# Patient Record
Sex: MALE | Race: WHITE | URBAN - METROPOLITAN AREA
[De-identification: names, ages, dates, MRNs, and addresses within clinical notes are randomized per-mention and may not be internally consistent; named-entity substitution may affect disease eponyms.]

---

## 2018-08-19 ENCOUNTER — EMERGENCY (EMERGENCY)
Facility: HOSPITAL | Age: 58
LOS: 0 days | Discharge: HOME | End: 2018-08-19
Attending: EMERGENCY MEDICINE | Admitting: EMERGENCY MEDICINE

## 2018-08-19 VITALS
TEMPERATURE: 98 F | WEIGHT: 199.96 LBS | HEIGHT: 71 IN | HEART RATE: 69 BPM | SYSTOLIC BLOOD PRESSURE: 138 MMHG | RESPIRATION RATE: 19 BRPM | DIASTOLIC BLOOD PRESSURE: 87 MMHG | OXYGEN SATURATION: 99 %

## 2018-08-19 VITALS — RESPIRATION RATE: 18 BRPM | TEMPERATURE: 97 F | OXYGEN SATURATION: 99 % | HEART RATE: 57 BPM
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
## 2018-08-19 DIAGNOSIS — S61.210A LACERATION WITHOUT FOREIGN BODY OF RIGHT INDEX FINGER WITHOUT DAMAGE TO NAIL, INITIAL ENCOUNTER: ICD-10-CM

## 2018-08-19 DIAGNOSIS — S61.213A LACERATION WITHOUT FOREIGN BODY OF LEFT MIDDLE FINGER WITHOUT DAMAGE TO NAIL, INITIAL ENCOUNTER: ICD-10-CM

## 2018-08-19 DIAGNOSIS — Z23 ENCOUNTER FOR IMMUNIZATION: ICD-10-CM
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
## 2018-08-19 DIAGNOSIS — S61.211A LACERATION WITHOUT FOREIGN BODY OF LEFT INDEX FINGER WITHOUT DAMAGE TO NAIL, INITIAL ENCOUNTER: ICD-10-CM
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
## 2018-08-19 RX ORDER — TETANUS TOXOID, REDUCED DIPHTHERIA TOXOID AND ACELLULAR PERTUSSIS VACCINE, ADSORBED 5; 2.5; 8; 8; 2.5 [IU]/.5ML; [IU]/.5ML; UG/.5ML; UG/.5ML; UG/.5ML
0.5 SUSPENSION INTRAMUSCULAR ONCE
Qty: 0 | Refills: 0 | Status: COMPLETED | OUTPATIENT
Start: 2018-08-19 | End: 2018-08-19

## 2018-08-19 RX ADMIN — TETANUS TOXOID, REDUCED DIPHTHERIA TOXOID AND ACELLULAR PERTUSSIS VACCINE, ADSORBED 0.5 MILLILITER(S): 5; 2.5; 8; 8; 2.5 SUSPENSION INTRAMUSCULAR at 14:46

## 2018-08-19 NOTE — ED PROCEDURE NOTE - ATTENDING CONTRIBUTION TO CARE
. I was present for and supervised the key critical aspects of the procedures performed during the care of the patient.

## 2018-08-19 NOTE — ED PROVIDER NOTE - PROGRESS NOTE DETAILS
57 y/o M on baby aspirin, no diabetes presents to ED after mistakenly cutting himself using a clean knife causing multiple lacerations to fingers. On exam: superficial lacerations to R 2nd MCP and L 2nd and 3rd MCP, 2nd finger being slightly deeper than the rest of the cuts but without evidence of tendon exposure from all fingers. Plan: lac repair and Tetanus. 57 y/o M on baby aspirin, no diabetes presents to ED after mistakenly cutting himself using a clean knife causing multiple lacerations to fingers. On exam: superficial lacerations to R 2nd MCP and L 2nd and 3rd finger, 2nd finger being slightly deeper than the rest of the laceration but without evidence of tendon exposure from all fingers. Plan: lac repair and Tetanus.

## 2018-08-19 NOTE — ED PROCEDURE NOTE - CPROC ED LACER REPAIR DETAIL1
The wound was explored to base in bloodless field.

## 2018-08-19 NOTE — ED PROVIDER NOTE - PHYSICAL EXAMINATION
skin:   left hand 2nd digit volar aspect proximal digit- +linear laceration with subcutaneous fat exposure, no tendon inujury appreciated under bloodless field. good rom of digit . motor and sensation in tact/ good cap refill    left hand 3rd digit small linear laceration superficial / no foreign body present    right hand skin:   left hand 2nd digit volar aspect proximal digit- +linear laceration with subcutaneous fat exposure, no tendon inujury appreciated under bloodless field. good rom of digit . motor and sensation in tact/ good cap refill    left hand 3rd digit small linear laceration superficial / no foreign body present    right hand 2nd digit linear laceration 0.5 cm / no foreign bodies / good rom of digit

## 2018-08-19 NOTE — ED PROCEDURE NOTE - NS ED ATTENDING STATEMENT MOD
I have personally performed a face to face diagnostic evaluation on this patient. I have reviewed the ACP note and agree with the history, exam and plan of care, except as noted.

## 2018-08-19 NOTE — ED PROVIDER NOTE - OBJECTIVE STATEMENT
59 y/o male s/p working with knife c/o laceration to left 2nd, 3rd digit and right hand  PTA. patient denies any tingling to digits. +active bleeding. no other injuries

## 2018-08-19 NOTE — ED ADULT NURSE NOTE - NSIMPLEMENTINTERV_GEN_ALL_ED
Implemented All Universal Safety Interventions:  [place] to call system. Call bell, personal items and telephone within reach. Instruct patient to call for assistance. Room bathroom lighting operational. Non-slip footwear when patient is off stretcher. Physically safe environment: no spills, clutter or unnecessary equipment. Stretcher in lowest position, wheels locked, appropriate side rails in place.

## 2018-08-19 NOTE — ED PROVIDER NOTE - CARE PLAN
Principal Discharge DX:	Laceration of left index finger without foreign body without damage to nail, initial encounter  Secondary Diagnosis:	Laceration of middle finger without foreign body without damage to nail, unspecified laterality, initial encounter  Secondary Diagnosis:	Laceration of index finger without foreign body without damage to nail, unspecified laterality, initial encounter

## 2018-08-19 NOTE — ED PROVIDER NOTE - SECONDARY DIAGNOSIS.
Laceration of middle finger without foreign body without damage to nail, unspecified laterality, initial encounter Laceration of index finger without foreign body without damage to nail, unspecified laterality, initial encounter

## 2018-08-22 PROBLEM — Z00.00 ENCOUNTER FOR PREVENTIVE HEALTH EXAMINATION: Status: ACTIVE | Noted: 2018-08-22

## 2018-08-29 ENCOUNTER — APPOINTMENT (OUTPATIENT)
Dept: PLASTIC SURGERY | Facility: CLINIC | Age: 58
End: 2018-08-29
Payer: COMMERCIAL

## 2018-08-29 DIAGNOSIS — S61.219S LACERATION W/OUT FOREIGN BODY OF LEFT HAND, SEQUELA: ICD-10-CM

## 2018-08-29 DIAGNOSIS — I82.509 CHRONIC EMBOLISM AND THROMBOSIS OF UNSPECIFIED DEEP VEINS OF UNSPECIFIED LOWER EXTREMITY: ICD-10-CM

## 2018-08-29 DIAGNOSIS — S61.412S LACERATION W/OUT FOREIGN BODY OF LEFT HAND, SEQUELA: ICD-10-CM

## 2018-08-29 PROCEDURE — 99202 OFFICE O/P NEW SF 15 MIN: CPT

## 2018-08-29 RX ORDER — ASPIRIN 81 MG
81 TABLET, DELAYED RELEASE (ENTERIC COATED) ORAL
Refills: 0 | Status: ACTIVE | COMMUNITY

## 2024-06-19 ENCOUNTER — OUTPATIENT (OUTPATIENT)
Dept: OUTPATIENT SERVICES | Facility: HOSPITAL | Age: 64
LOS: 1 days | End: 2024-06-19
Payer: COMMERCIAL

## 2024-06-19 VITALS
DIASTOLIC BLOOD PRESSURE: 78 MMHG | TEMPERATURE: 98 F | SYSTOLIC BLOOD PRESSURE: 121 MMHG | HEIGHT: 70 IN | WEIGHT: 194.01 LBS | RESPIRATION RATE: 16 BRPM | HEART RATE: 69 BPM | OXYGEN SATURATION: 99 %

## 2024-06-19 DIAGNOSIS — Z01.818 ENCOUNTER FOR OTHER PREPROCEDURAL EXAMINATION: ICD-10-CM

## 2024-06-19 DIAGNOSIS — Z87.828 PERSONAL HISTORY OF OTHER (HEALED) PHYSICAL INJURY AND TRAUMA: Chronic | ICD-10-CM

## 2024-06-19 DIAGNOSIS — Z98.890 OTHER SPECIFIED POSTPROCEDURAL STATES: Chronic | ICD-10-CM

## 2024-06-19 DIAGNOSIS — G96.191 PERINEURAL CYST: ICD-10-CM

## 2024-06-19 LAB
ALBUMIN SERPL ELPH-MCNC: 4.6 G/DL — SIGNIFICANT CHANGE UP (ref 3.5–5.2)
ALP SERPL-CCNC: 73 U/L — SIGNIFICANT CHANGE UP (ref 30–115)
ALT FLD-CCNC: 26 U/L — SIGNIFICANT CHANGE UP (ref 0–41)
ANION GAP SERPL CALC-SCNC: 14 MMOL/L — SIGNIFICANT CHANGE UP (ref 7–14)
AST SERPL-CCNC: 19 U/L — SIGNIFICANT CHANGE UP (ref 0–41)
BASOPHILS # BLD AUTO: 0.03 K/UL — SIGNIFICANT CHANGE UP (ref 0–0.2)
BASOPHILS NFR BLD AUTO: 0.7 % — SIGNIFICANT CHANGE UP (ref 0–1)
BILIRUB SERPL-MCNC: 0.5 MG/DL — SIGNIFICANT CHANGE UP (ref 0.2–1.2)
BUN SERPL-MCNC: 13 MG/DL — SIGNIFICANT CHANGE UP (ref 10–20)
CALCIUM SERPL-MCNC: 9.5 MG/DL — SIGNIFICANT CHANGE UP (ref 8.4–10.5)
CHLORIDE SERPL-SCNC: 105 MMOL/L — SIGNIFICANT CHANGE UP (ref 98–110)
CO2 SERPL-SCNC: 23 MMOL/L — SIGNIFICANT CHANGE UP (ref 17–32)
CREAT SERPL-MCNC: 0.9 MG/DL — SIGNIFICANT CHANGE UP (ref 0.7–1.5)
EGFR: 96 ML/MIN/1.73M2 — SIGNIFICANT CHANGE UP
EOSINOPHIL # BLD AUTO: 0.08 K/UL — SIGNIFICANT CHANGE UP (ref 0–0.7)
EOSINOPHIL NFR BLD AUTO: 1.9 % — SIGNIFICANT CHANGE UP (ref 0–8)
GLUCOSE SERPL-MCNC: 93 MG/DL — SIGNIFICANT CHANGE UP (ref 70–99)
HCT VFR BLD CALC: 44.8 % — SIGNIFICANT CHANGE UP (ref 42–52)
HGB BLD-MCNC: 15.6 G/DL — SIGNIFICANT CHANGE UP (ref 14–18)
IMM GRANULOCYTES NFR BLD AUTO: 0 % — LOW (ref 0.1–0.3)
LYMPHOCYTES # BLD AUTO: 1.74 K/UL — SIGNIFICANT CHANGE UP (ref 1.2–3.4)
LYMPHOCYTES # BLD AUTO: 42.3 % — SIGNIFICANT CHANGE UP (ref 20.5–51.1)
MCHC RBC-ENTMCNC: 30.1 PG — SIGNIFICANT CHANGE UP (ref 27–31)
MCHC RBC-ENTMCNC: 34.8 G/DL — SIGNIFICANT CHANGE UP (ref 32–37)
MCV RBC AUTO: 86.3 FL — SIGNIFICANT CHANGE UP (ref 80–94)
MONOCYTES # BLD AUTO: 0.33 K/UL — SIGNIFICANT CHANGE UP (ref 0.1–0.6)
MONOCYTES NFR BLD AUTO: 8 % — SIGNIFICANT CHANGE UP (ref 1.7–9.3)
NEUTROPHILS # BLD AUTO: 1.93 K/UL — SIGNIFICANT CHANGE UP (ref 1.4–6.5)
NEUTROPHILS NFR BLD AUTO: 47.1 % — SIGNIFICANT CHANGE UP (ref 42.2–75.2)
NRBC # BLD: 0 /100 WBCS — SIGNIFICANT CHANGE UP (ref 0–0)
PLATELET # BLD AUTO: 163 K/UL — SIGNIFICANT CHANGE UP (ref 130–400)
PMV BLD: 11 FL — HIGH (ref 7.4–10.4)
POTASSIUM SERPL-MCNC: 4.1 MMOL/L — SIGNIFICANT CHANGE UP (ref 3.5–5)
POTASSIUM SERPL-SCNC: 4.1 MMOL/L — SIGNIFICANT CHANGE UP (ref 3.5–5)
PROT SERPL-MCNC: 7.2 G/DL — SIGNIFICANT CHANGE UP (ref 6–8)
RBC # BLD: 5.19 M/UL — SIGNIFICANT CHANGE UP (ref 4.7–6.1)
RBC # FLD: 13.3 % — SIGNIFICANT CHANGE UP (ref 11.5–14.5)
SODIUM SERPL-SCNC: 142 MMOL/L — SIGNIFICANT CHANGE UP (ref 135–146)
WBC # BLD: 4.11 K/UL — LOW (ref 4.8–10.8)
WBC # FLD AUTO: 4.11 K/UL — LOW (ref 4.8–10.8)

## 2024-06-19 PROCEDURE — 80053 COMPREHEN METABOLIC PANEL: CPT

## 2024-06-19 PROCEDURE — 93010 ELECTROCARDIOGRAM REPORT: CPT

## 2024-06-19 PROCEDURE — 99214 OFFICE O/P EST MOD 30 MIN: CPT | Mod: 25

## 2024-06-19 PROCEDURE — 85025 COMPLETE CBC W/AUTO DIFF WBC: CPT

## 2024-06-19 PROCEDURE — 93005 ELECTROCARDIOGRAM TRACING: CPT

## 2024-06-19 PROCEDURE — 36415 COLL VENOUS BLD VENIPUNCTURE: CPT

## 2024-06-20 DIAGNOSIS — G96.191 PERINEURAL CYST: ICD-10-CM

## 2024-06-20 DIAGNOSIS — Z01.818 ENCOUNTER FOR OTHER PREPROCEDURAL EXAMINATION: ICD-10-CM

## 2024-06-24 ENCOUNTER — OUTPATIENT (OUTPATIENT)
Dept: OUTPATIENT SERVICES | Facility: HOSPITAL | Age: 64
LOS: 1 days | Discharge: HOME | End: 2024-06-24
Payer: COMMERCIAL

## 2024-06-24 ENCOUNTER — TRANSCRIPTION ENCOUNTER (OUTPATIENT)
Age: 64
End: 2024-06-24

## 2024-06-24 VITALS
HEIGHT: 69 IN | OXYGEN SATURATION: 99 % | DIASTOLIC BLOOD PRESSURE: 82 MMHG | TEMPERATURE: 98 F | WEIGHT: 194.01 LBS | RESPIRATION RATE: 18 BRPM | SYSTOLIC BLOOD PRESSURE: 138 MMHG | HEART RATE: 84 BPM

## 2024-06-24 VITALS — SYSTOLIC BLOOD PRESSURE: 123 MMHG | HEART RATE: 59 BPM | RESPIRATION RATE: 17 BRPM | DIASTOLIC BLOOD PRESSURE: 75 MMHG

## 2024-06-24 DIAGNOSIS — G96.191 PERINEURAL CYST: ICD-10-CM

## 2024-06-24 DIAGNOSIS — Z98.890 OTHER SPECIFIED POSTPROCEDURAL STATES: Chronic | ICD-10-CM

## 2024-06-24 DIAGNOSIS — Z87.828 PERSONAL HISTORY OF OTHER (HEALED) PHYSICAL INJURY AND TRAUMA: Chronic | ICD-10-CM

## 2024-06-24 DIAGNOSIS — L72.0 EPIDERMAL CYST: ICD-10-CM

## 2024-06-24 DIAGNOSIS — E78.5 HYPERLIPIDEMIA, UNSPECIFIED: ICD-10-CM

## 2024-06-24 DIAGNOSIS — I10 ESSENTIAL (PRIMARY) HYPERTENSION: ICD-10-CM

## 2024-06-24 DIAGNOSIS — K62.89 OTHER SPECIFIED DISEASES OF ANUS AND RECTUM: ICD-10-CM

## 2024-06-24 PROCEDURE — C9290: CPT

## 2024-06-24 PROCEDURE — 88304 TISSUE EXAM BY PATHOLOGIST: CPT | Mod: 26

## 2024-06-24 PROCEDURE — 88304 TISSUE EXAM BY PATHOLOGIST: CPT

## 2024-06-24 RX ORDER — OXYCODONE AND ACETAMINOPHEN 5; 325 MG/1; MG/1
1 TABLET ORAL
Qty: 12 | Refills: 0
Start: 2024-06-24 | End: 2024-06-27

## 2024-06-24 RX ORDER — AMLODIPINE BESYLATE 2.5 MG/1
1 TABLET ORAL
Refills: 0 | DISCHARGE

## 2024-06-24 RX ORDER — MEPERIDINE HYDROCHLORIDE 50 MG/1
12.5 TABLET ORAL ONCE
Refills: 0 | Status: DISCONTINUED | OUTPATIENT
Start: 2024-06-24 | End: 2024-06-24

## 2024-06-24 RX ORDER — HYDROMORPHONE HCL 0.2 MG/ML
0.5 INJECTION, SOLUTION INTRAVENOUS
Refills: 0 | Status: DISCONTINUED | OUTPATIENT
Start: 2024-06-24 | End: 2024-06-24

## 2024-06-24 RX ORDER — ACETAMINOPHEN 325 MG
650 TABLET ORAL ONCE
Refills: 0 | Status: DISCONTINUED | OUTPATIENT
Start: 2024-06-24 | End: 2024-06-24

## 2024-06-24 RX ORDER — HYDROMORPHONE HCL 0.2 MG/ML
1 INJECTION, SOLUTION INTRAVENOUS
Refills: 0 | Status: DISCONTINUED | OUTPATIENT
Start: 2024-06-24 | End: 2024-06-24

## 2024-06-24 RX ORDER — ATORVASTATIN CALCIUM 20 MG/1
1 TABLET, FILM COATED ORAL
Refills: 0 | DISCHARGE

## 2024-06-24 RX ORDER — AMOXICILLIN/POTASSIUM CLAV 250-125 MG
875 TABLET ORAL
Qty: 10 | Refills: 0
Start: 2024-06-24 | End: 2024-06-28

## 2024-06-24 RX ORDER — DEXTROSE MONOHYDRATE AND SODIUM CHLORIDE 5; .3 G/100ML; G/100ML
1000 INJECTION, SOLUTION INTRAVENOUS
Refills: 0 | Status: DISCONTINUED | OUTPATIENT
Start: 2024-06-24 | End: 2024-06-24

## 2024-06-24 RX ORDER — ONDANSETRON HYDROCHLORIDE 2 MG/ML
4 INJECTION INTRAMUSCULAR; INTRAVENOUS ONCE
Refills: 0 | Status: DISCONTINUED | OUTPATIENT
Start: 2024-06-24 | End: 2024-06-24

## 2024-06-24 RX ADMIN — DEXTROSE MONOHYDRATE AND SODIUM CHLORIDE 100 MILLILITER(S): 5; .3 INJECTION, SOLUTION INTRAVENOUS at 10:11

## 2024-06-28 LAB — SURGICAL PATHOLOGY STUDY: SIGNIFICANT CHANGE UP
